# Patient Record
Sex: MALE | Race: WHITE
[De-identification: names, ages, dates, MRNs, and addresses within clinical notes are randomized per-mention and may not be internally consistent; named-entity substitution may affect disease eponyms.]

---

## 2021-05-14 ENCOUNTER — HOSPITAL ENCOUNTER (EMERGENCY)
Dept: HOSPITAL 41 - JD.ED | Age: 46
Discharge: HOME | End: 2021-05-14
Payer: COMMERCIAL

## 2021-05-14 DIAGNOSIS — I10: ICD-10-CM

## 2021-05-14 DIAGNOSIS — K08.89: Primary | ICD-10-CM

## 2021-05-14 DIAGNOSIS — Z72.0: ICD-10-CM

## 2021-05-14 PROCEDURE — 99282 EMERGENCY DEPT VISIT SF MDM: CPT

## 2021-05-14 NOTE — EDM.PDOC
ED HPI GENERAL MEDICAL PROBLEM





- General


Chief Complaint: ENT Problem


Stated Complaint: tooth pain


Time Seen by Provider: 05/14/21 02:14


Source of Information: Reports: Patient, RN Notes Reviewed





- History of Present Illness


INITIAL COMMENTS - FREE TEXT/NARRATIVE: 





46 yr old male with dental pain, L upper post molars and L lower jaw as well. No

fever, chills, nausea or vomiting. 


  ** Left Lower Tooth/Teeth


Pain Score (Numeric/FACES): 6





- Related Data


                                    Allergies











Allergy/AdvReac Type Severity Reaction Status Date / Time


 


No Known Allergies Allergy   Verified 05/14/21 02:22











Home Meds: 


                                    Home Meds





Amlodipine Besylate/Valsartan [Amlodipine-Valsartan 5-160 mg] 1 tab PO DAILY 

05/14/21 [History]


Amoxicillin 1,000 mg PO BID #30 capsule 05/14/21 [Rx]











Past Medical History


Cardiovascular History: Reports: Hypertension





- Infectious Disease History


Infectious Disease History: Reports: Chicken Pox





- Past Surgical History


HEENT Surgical History: Reports: Oral Surgery


Other HEENT Surgeries/Procedures: wisdom teeth





Social & Family History





- Family History


Family Medical History: No Pertinent Family History





- Tobacco Use


Tobacco Use Status *Q: Current Every Day Tobacco User


Years of Tobacco use: 25


Packs/Tins Daily: 1.5





- Caffeine Use


Caffeine Use: Reports: None





- Recreational Drug Use


Recreational Drug Use: No





ED ROS ENT





- Review of Systems


Review Of Systems: See Below


Constitutional: Denies: Fever, Chills


HEENT: Reports: Dental Pain


Respiratory: Denies: Shortness of Breath


Cardiovascular: Denies: Chest Pain


GI/Abdominal: Denies: Abdominal Pain, Nausea, Vomiting


Musculoskeletal: Reports: No Symptoms


Skin: Reports: No Symptoms


Neurological: Denies: Headache





ED EXAM, ENT





- Physical Exam


Exam: See Below


General Appearance: Alert, No Apparent Distress


Ears: Normal External Exam


Nose: Normal Inspection, Other (cavitation L upper and lower teeth, mild 

swelling L upper post gum, no drainage)


Neck: Supple


Respiratory/Chest: No Respiratory Distress, Lungs Clear, Normal Breath Sounds


Cardiovascular: Regular Rate, Rhythm


Extremities: Normal Inspection, Normal Range of Motion


Neurological: Alert, Oriented, No Motor/Sensory Deficits


Skin: Warm, Dry, Normal Color





Course





- Vital Signs


Last Recorded V/S: 


                                Last Vital Signs











Temp  97.3 F   05/14/21 02:16


 


Pulse  91   05/14/21 02:16


 


Resp  16   05/14/21 02:16


 


BP  122/81   05/14/21 02:16


 


Pulse Ox  99   05/14/21 02:16














- Orders/Labs/Meds


Meds: 


Medications














Discontinued Medications














Generic Name Dose Route Start Last Admin





  Trade Name Oneil  PRN Reason Stop Dose Admin


 


Amoxicillin  1,000 mg  05/14/21 02:25  05/14/21 02:35





  Amoxicillin 500 Mg Cap  PO  05/14/21 02:26  1,000 mg





  ONETIME ONE   Administration














Departure





- Departure


Time of Disposition: 02:34


Disposition: Home, Self-Care 01


Condition: Fair


Clinical Impression: 


 Pain, dental








- Discharge Information


Prescriptions: 


Amoxicillin 1,000 mg PO BID #30 capsule


Instructions:  Managing Pain Without Opioids


Referrals: 


Dayanna Marsh NP [Primary Care Provider] - 


Forms:  ED Department Discharge


Additional Instructions: 


Amoxicillin 1000 mg twice daily.  Because your first dose today is at 2:15 AM 

take an extra 500 mg around noon today, than 1000 mg tonight at bedtime.  

Continue 2  500 mg tablets twice daily for 1 week or until gone.  Prescription 

has been sent to [facility] on [address].  See dentist as soon as 

possible.  





Sepsis Event Note (ED)





- Evaluation


Sepsis Screening Result: No Definite Risk





- Focused Exam


Vital Signs: 


                                   Vital Signs











  Temp Pulse Resp BP Pulse Ox


 


 05/14/21 02:16  97.3 F  91  16  122/81  99

## 2022-06-01 ENCOUNTER — HOSPITAL ENCOUNTER (EMERGENCY)
Dept: HOSPITAL 41 - JD.ED | Age: 47
Discharge: HOME | End: 2022-06-01
Payer: COMMERCIAL

## 2022-06-01 DIAGNOSIS — Z88.8: ICD-10-CM

## 2022-06-01 DIAGNOSIS — Z79.01: ICD-10-CM

## 2022-06-01 DIAGNOSIS — F17.210: ICD-10-CM

## 2022-06-01 DIAGNOSIS — Z79.899: ICD-10-CM

## 2022-06-01 DIAGNOSIS — I50.9: ICD-10-CM

## 2022-06-01 DIAGNOSIS — Z23: ICD-10-CM

## 2022-06-01 DIAGNOSIS — I48.91: Primary | ICD-10-CM

## 2022-06-01 LAB — EGFRCR SERPLBLD CKD-EPI 2021: > 60 ML/MIN (ref 60–?)

## 2022-06-01 PROCEDURE — 80053 COMPREHEN METABOLIC PANEL: CPT

## 2022-06-01 PROCEDURE — 85025 COMPLETE CBC W/AUTO DIFF WBC: CPT

## 2022-06-01 PROCEDURE — 96365 THER/PROPH/DIAG IV INF INIT: CPT

## 2022-06-01 PROCEDURE — 99285 EMERGENCY DEPT VISIT HI MDM: CPT

## 2022-06-01 PROCEDURE — 84443 ASSAY THYROID STIM HORMONE: CPT

## 2022-06-01 PROCEDURE — 80307 DRUG TEST PRSMV CHEM ANLYZR: CPT

## 2022-06-01 PROCEDURE — 93005 ELECTROCARDIOGRAM TRACING: CPT

## 2022-06-01 PROCEDURE — 85610 PROTHROMBIN TIME: CPT

## 2022-06-01 PROCEDURE — 96366 THER/PROPH/DIAG IV INF ADDON: CPT

## 2022-06-01 PROCEDURE — 85730 THROMBOPLASTIN TIME PARTIAL: CPT

## 2022-06-01 PROCEDURE — 90471 IMMUNIZATION ADMIN: CPT

## 2022-06-01 PROCEDURE — 86140 C-REACTIVE PROTEIN: CPT

## 2022-06-01 PROCEDURE — 96375 TX/PRO/DX INJ NEW DRUG ADDON: CPT

## 2022-06-01 PROCEDURE — 71045 X-RAY EXAM CHEST 1 VIEW: CPT

## 2022-06-01 PROCEDURE — 90715 TDAP VACCINE 7 YRS/> IM: CPT

## 2022-06-01 PROCEDURE — 82553 CREATINE MB FRACTION: CPT

## 2022-06-01 PROCEDURE — 83880 ASSAY OF NATRIURETIC PEPTIDE: CPT

## 2022-06-01 PROCEDURE — 36415 COLL VENOUS BLD VENIPUNCTURE: CPT

## 2022-06-01 PROCEDURE — 83735 ASSAY OF MAGNESIUM: CPT

## 2022-06-01 PROCEDURE — 84484 ASSAY OF TROPONIN QUANT: CPT
